# Patient Record
Sex: FEMALE | URBAN - METROPOLITAN AREA
[De-identification: names, ages, dates, MRNs, and addresses within clinical notes are randomized per-mention and may not be internally consistent; named-entity substitution may affect disease eponyms.]

---

## 2017-09-06 ENCOUNTER — APPOINTMENT (RX ONLY)
Dept: URBAN - METROPOLITAN AREA CLINIC 143 | Facility: CLINIC | Age: 47
Setting detail: DERMATOLOGY
End: 2017-09-06

## 2017-09-06 DIAGNOSIS — Z41.9 ENCOUNTER FOR PROCEDURE FOR PURPOSES OTHER THAN REMEDYING HEALTH STATE, UNSPECIFIED: ICD-10-CM

## 2017-09-06 PROCEDURE — ? BOTOX

## 2017-09-06 PROCEDURE — ? ADDITIONAL NOTES

## 2017-09-06 NOTE — PROCEDURE: BOTOX
Additional Area 6 Location: Upper cutaneous lip
Additional Area 5 Location: Bunny lines
Inferior Lateral Orbicularis Oculi Units: 0
Expiration Date (Month Year): 11-19
Additional Area 2 Location: Brow s
Forehead Units: 20
Additional Area 3 Location: Chin
Post-Care Instructions: Patient instructed to not lie down for 4 hours and limit physical activity for 24 hours. Patient instructed not to travel by airplane for 48 hours.
Price (Use Numbers Only, No Special Characters Or $): 9.00
Additional Area 1 Location: Upper lip
Consent: Written consent obtained. Risks include but not limited to lid/brow ptosis, bruising, swelling, diplopia, temporary effect, incomplete chemical denervation.
Lot #: [id number]
Additional Area 4 Location: Lower mandible for smile
Detail Level: Simple
Dilution (U/0.1 Cc): 1